# Patient Record
Sex: MALE | Race: WHITE | URBAN - METROPOLITAN AREA
[De-identification: names, ages, dates, MRNs, and addresses within clinical notes are randomized per-mention and may not be internally consistent; named-entity substitution may affect disease eponyms.]

---

## 2022-02-09 ENCOUNTER — PATIENT MESSAGE (OUTPATIENT)
Dept: INTERNAL MEDICINE | Facility: CLINIC | Age: 32
End: 2022-02-09
Payer: COMMERCIAL

## 2022-02-10 ENCOUNTER — LAB VISIT (OUTPATIENT)
Dept: LAB | Facility: HOSPITAL | Age: 32
End: 2022-02-10
Payer: COMMERCIAL

## 2022-02-10 ENCOUNTER — OFFICE VISIT (OUTPATIENT)
Dept: INTERNAL MEDICINE | Facility: CLINIC | Age: 32
End: 2022-02-10
Payer: COMMERCIAL

## 2022-02-10 ENCOUNTER — PATIENT MESSAGE (OUTPATIENT)
Dept: INTERNAL MEDICINE | Facility: CLINIC | Age: 32
End: 2022-02-10

## 2022-02-10 DIAGNOSIS — Z83.49 FAMILY HISTORY OF HEMOCHROMATOSIS: Primary | ICD-10-CM

## 2022-02-10 DIAGNOSIS — Z00.00 ANNUAL PHYSICAL EXAM: ICD-10-CM

## 2022-02-10 DIAGNOSIS — R10.11 RIGHT UPPER QUADRANT ABDOMINAL PAIN: Primary | ICD-10-CM

## 2022-02-10 DIAGNOSIS — Z83.49 FAMILY HISTORY OF HEMOCHROMATOSIS: ICD-10-CM

## 2022-02-10 DIAGNOSIS — R10.11 RIGHT UPPER QUADRANT ABDOMINAL PAIN: ICD-10-CM

## 2022-02-10 LAB
ALBUMIN SERPL BCP-MCNC: 4.6 G/DL (ref 3.5–5.2)
ALP SERPL-CCNC: 62 U/L (ref 55–135)
ALT SERPL W/O P-5'-P-CCNC: 24 U/L (ref 10–44)
ANION GAP SERPL CALC-SCNC: 9 MMOL/L (ref 8–16)
AST SERPL-CCNC: 24 U/L (ref 10–40)
BASOPHILS # BLD AUTO: 0.04 K/UL (ref 0–0.2)
BASOPHILS NFR BLD: 0.6 % (ref 0–1.9)
BILIRUB SERPL-MCNC: 0.8 MG/DL (ref 0.1–1)
BILIRUB UR QL STRIP: NEGATIVE
BUN SERPL-MCNC: 10 MG/DL (ref 6–20)
CALCIUM SERPL-MCNC: 10 MG/DL (ref 8.7–10.5)
CHLORIDE SERPL-SCNC: 102 MMOL/L (ref 95–110)
CHOLEST SERPL-MCNC: 253 MG/DL (ref 120–199)
CHOLEST/HDLC SERPL: 5.5 {RATIO} (ref 2–5)
CLARITY UR REFRACT.AUTO: CLEAR
CO2 SERPL-SCNC: 29 MMOL/L (ref 23–29)
COLOR UR AUTO: YELLOW
CREAT SERPL-MCNC: 0.8 MG/DL (ref 0.5–1.4)
DIFFERENTIAL METHOD: NORMAL
EOSINOPHIL # BLD AUTO: 0.3 K/UL (ref 0–0.5)
EOSINOPHIL NFR BLD: 4.6 % (ref 0–8)
ERYTHROCYTE [DISTWIDTH] IN BLOOD BY AUTOMATED COUNT: 12 % (ref 11.5–14.5)
EST. GFR  (AFRICAN AMERICAN): >60 ML/MIN/1.73 M^2
EST. GFR  (NON AFRICAN AMERICAN): >60 ML/MIN/1.73 M^2
FERRITIN SERPL-MCNC: 167 NG/ML (ref 20–300)
GLUCOSE SERPL-MCNC: 87 MG/DL (ref 70–110)
GLUCOSE UR QL STRIP: NEGATIVE
HCT VFR BLD AUTO: 46.7 % (ref 40–54)
HDLC SERPL-MCNC: 46 MG/DL (ref 40–75)
HDLC SERPL: 18.2 % (ref 20–50)
HGB BLD-MCNC: 15.7 G/DL (ref 14–18)
HGB UR QL STRIP: NEGATIVE
IMM GRANULOCYTES # BLD AUTO: 0.01 K/UL (ref 0–0.04)
IMM GRANULOCYTES NFR BLD AUTO: 0.2 % (ref 0–0.5)
KETONES UR QL STRIP: NEGATIVE
LDLC SERPL CALC-MCNC: 179.8 MG/DL (ref 63–159)
LEUKOCYTE ESTERASE UR QL STRIP: NEGATIVE
LYMPHOCYTES # BLD AUTO: 2.3 K/UL (ref 1–4.8)
LYMPHOCYTES NFR BLD: 34.9 % (ref 18–48)
MCH RBC QN AUTO: 30.7 PG (ref 27–31)
MCHC RBC AUTO-ENTMCNC: 33.6 G/DL (ref 32–36)
MCV RBC AUTO: 91 FL (ref 82–98)
MONOCYTES # BLD AUTO: 0.5 K/UL (ref 0.3–1)
MONOCYTES NFR BLD: 7.3 % (ref 4–15)
NEUTROPHILS # BLD AUTO: 3.4 K/UL (ref 1.8–7.7)
NEUTROPHILS NFR BLD: 52.4 % (ref 38–73)
NITRITE UR QL STRIP: NEGATIVE
NONHDLC SERPL-MCNC: 207 MG/DL
NRBC BLD-RTO: 0 /100 WBC
PH UR STRIP: 7 [PH] (ref 5–8)
PLATELET # BLD AUTO: 247 K/UL (ref 150–450)
PMV BLD AUTO: 10.3 FL (ref 9.2–12.9)
POTASSIUM SERPL-SCNC: 4.3 MMOL/L (ref 3.5–5.1)
PROT SERPL-MCNC: 7.6 G/DL (ref 6–8.4)
PROT UR QL STRIP: NEGATIVE
RBC # BLD AUTO: 5.12 M/UL (ref 4.6–6.2)
SODIUM SERPL-SCNC: 140 MMOL/L (ref 136–145)
SP GR UR STRIP: 1.01 (ref 1–1.03)
TRANSFERRIN SERPL-MCNC: 297 MG/DL (ref 200–375)
TRIGL SERPL-MCNC: 136 MG/DL (ref 30–150)
URN SPEC COLLECT METH UR: NORMAL
WBC # BLD AUTO: 6.47 K/UL (ref 3.9–12.7)

## 2022-02-10 PROCEDURE — 99999 PR PBB SHADOW E&M-EST. PATIENT-LVL II: ICD-10-PCS | Mod: PBBFAC,,,

## 2022-02-10 PROCEDURE — 84466 ASSAY OF TRANSFERRIN: CPT

## 2022-02-10 PROCEDURE — 82728 ASSAY OF FERRITIN: CPT

## 2022-02-10 PROCEDURE — 99999 PR PBB SHADOW E&M-EST. PATIENT-LVL II: CPT | Mod: PBBFAC,,,

## 2022-02-10 PROCEDURE — 99204 OFFICE O/P NEW MOD 45 MIN: CPT | Mod: S$GLB,,,

## 2022-02-10 PROCEDURE — 80061 LIPID PANEL: CPT

## 2022-02-10 PROCEDURE — 36415 COLL VENOUS BLD VENIPUNCTURE: CPT

## 2022-02-10 PROCEDURE — 80053 COMPREHEN METABOLIC PANEL: CPT

## 2022-02-10 PROCEDURE — 99204 PR OFFICE/OUTPT VISIT, NEW, LEVL IV, 45-59 MIN: ICD-10-PCS | Mod: S$GLB,,,

## 2022-02-10 PROCEDURE — 85025 COMPLETE CBC W/AUTO DIFF WBC: CPT

## 2022-02-10 PROCEDURE — 81003 URINALYSIS AUTO W/O SCOPE: CPT

## 2022-02-10 NOTE — PROGRESS NOTES
[facility]  RESIDENT CLINIC PROGRESS NOTE    Name: Patrick Franke  : 1990  Date of Service: 02/10/2022   PCP: Humberto Bettencourt MD      Subjective:             Reason for visit: No chief complaint on file.      HPI: Patrick Franke is a 31 y.o. male with Hx of Nephrolithiasis and Hx of untreated strep throat with resulting frequent sinus infections who presents to clinic for RUQ abdominal pain and an annual physical.    Abdominal Pain: The pain is described as stabbing, and is 8/10 in intensity. Pain is located in the RUQ without radiation. Onset was 2 weeks ago. Symptoms have been gradually worsening since. Aggravating factors: none noticed by pt.  Alleviating factors: belching, bowel movements, but he hasn't tried any meds.  Associated symptoms: belching, flatus. The patient denies anorexia, arthralgias, chills, constipation, diarrhea, dysuria, fever, frequency, headache, hematochezia, hematuria, melena, myalgias, nausea, sweats and vomiting.  He says there is no association of the timing of the pain with eating.    He describes a similar episode of similar symptoms 12 years ago: at the time, a doctor told him it was due to calcium oxalate kidney stones, although he can't remember which exact tests were used to come to that conclusion (but he thinks it was maybe by urinalysis).  The doctor advised him at the time to drink lots of H2O.  He did so, and the symptoms went away after a few weeks.  The doctor also told him to avoid a diet rich in oxalate such as broccoli, etc.  He has been drinking plenty of water since this current episode started but has not changed his diet.    Mr. Franke also seeks to establish care with [facility] today, as he is an  who recently moved to the area from [place], [place].    FHx: Paternal grandfather: Heart Dx, Dad: high cholesterol.  Mom's side: DM2, Sister: Type 1 DM after EtOH.  He describes a family history of hemochromatosis in his  paternal grandfather and father.  SHx: none.  Meds: only antihistamine when allergies flare.  - Cardiovascular: Diet is healthy, minimal fried foods.  Exercise: runs and bikes, abisai before abdo pain.  - Cancer: No FHx.  Former 1PPW smoker in college (last period of consistent smoking was 8 yrs ago), now just 1 cigarette on occasion.  EtOH: 4 drinks/wk.  Drugs: none.  - Immunizations: Needs TDAP, influenza.  Has been vaccinated and boosted v COVID.  - STDs/Bloodborne: Sexually active w fiance only, since 3 yrs.  No complaints of STD symptoms.      Medications:   Current Outpatient Medications:     diphth,pertus,acell,,tetanus (BOOSTRIX TDAP) 2.5-8-5 Lf-mcg-Lf/0.5mL Syrg injection, Inject 0.5 mLs into the muscle once. for 1 dose, Disp: 0.5 mL, Rfl: 0    flu vacc [id number] 6mos up,PF, 60 mcg (15 mcg x 4)/0.5 mL Syrg, Inject 0.5 mLs into the muscle once. for 1 dose, Disp: 0.5 mL, Rfl: 0     Review of Systems:   Review of Systems   Constitutional: Negative for chills, fever, malaise/fatigue and weight loss.   HENT: Negative for congestion, nosebleeds, sinus pain and sore throat.    Eyes: Negative for blurred vision, photophobia, pain and redness.   Respiratory: Negative for cough, hemoptysis, sputum production, shortness of breath, wheezing and stridor.    Cardiovascular: Negative for chest pain, palpitations, orthopnea, claudication and leg swelling.   Gastrointestinal: Positive for abdominal pain. Negative for blood in stool, constipation, diarrhea, heartburn, nausea and vomiting.   Genitourinary: Negative for dysuria, flank pain, frequency and urgency.   Musculoskeletal: Negative for back pain, joint pain, myalgias and neck pain.   Skin: Negative for itching and rash.   Neurological: Negative for dizziness, tremors, seizures and headaches.   Endo/Heme/Allergies: Negative for polydipsia.   Psychiatric/Behavioral: Negative for depression and substance abuse. The patient is not nervous/anxious and does not have  insomnia.           Objective:       Vitals:   BP: 128/74  HR: 75  RR: 14  SpO2: 98%  T: 97.5F    Height: 177 cm  Weight: 80.2 kg  BMI: 25.6    Physical Exam:   Physical Exam  Constitutional:       General: He is not in acute distress.     Appearance: Normal appearance. He is normal weight. He is not ill-appearing.   HENT:      Head: Normocephalic and atraumatic.      Right Ear: External ear normal.      Left Ear: External ear normal.      Nose: Nose normal.      Mouth/Throat:      Mouth: Mucous membranes are moist.      Pharynx: Oropharynx is clear. No oropharyngeal exudate or posterior oropharyngeal erythema.   Eyes:      Extraocular Movements: Extraocular movements intact.      Conjunctiva/sclera: Conjunctivae normal.      Pupils: Pupils are equal, round, and reactive to light.   Cardiovascular:      Rate and Rhythm: Normal rate and regular rhythm.      Pulses: Normal pulses.      Heart sounds: Normal heart sounds.   Pulmonary:      Effort: Pulmonary effort is normal.      Breath sounds: Normal breath sounds.   Abdominal:      General: Abdomen is flat. Bowel sounds are normal. There is no distension.      Palpations: Abdomen is soft. There is no mass.      Tenderness: There is no abdominal tenderness. There is no right CVA tenderness, left CVA tenderness, guarding or rebound.      Hernia: No hernia is present.   Musculoskeletal:         General: Normal range of motion.      Cervical back: Normal range of motion and neck supple.      Right lower leg: No edema.      Left lower leg: No edema.   Lymphadenopathy:      Cervical: No cervical adenopathy.   Skin:     General: Skin is warm and dry.      Capillary Refill: Capillary refill takes less than 2 seconds.      Coloration: Skin is not jaundiced.   Neurological:      General: No focal deficit present.      Mental Status: He is alert and oriented to person, place, and time.   Psychiatric:         Mood and Affect: Mood normal.         Behavior: Behavior normal.          Thought Content: Thought content normal.         Judgment: Judgment normal.         Labs: No previous labs available for review.    Imaging: No previous imaging available for review.        Assessment and Plan:       Right upper quadrant abdominal pain  I suspect this to be a recurrence of his previous nephrolithiasis, because of his previous history, lack of GI symptoms or findings, and not having changed his diet to avoid pro-lithiasis foods.  - Educated on drinking water and diet to avoid Ca2+ - oxalate stone formation  - Instructed to RTC in 2 wks if sxs don't resolve, or to go to ER if nausea, fever, or intense, unrelenting pain  - Urinalysis  - CMP    Annual physical exam  Other than his abdominal complaint above, he is healthy.  - CBC  - CMP  - A1c  - TDAP and flu vaccines    Family history of hemochromatosis  Physical exam today is completely benign. No hepatomegaly, Watson's sign negative, skin and nails clear.  Although abdominal pain can be a presenting sign, and we will check for hemochromatosis in him per the guidelines, his benign exam makes me suspect his abdo pain is more likely a recurrence of his previous nephrolithiasis as per above.  - Ferritin, Transferrin saturation today  - If either is elevated, will order an HFE mutation analysis    Preventative Health: as above.    RTC in 1 year, or 2 weeks if abdominal symptoms persist.    Discussed with Dr. Tone Bettencourt MD MPH  [facility], PGY-I  [facility]

## 2022-02-10 NOTE — PATIENT INSTRUCTIONS
We suspect you are having a recurrence of calcium-oxalate kidney stones developing.  Keep drinking plenty of water (a few sips more than what your body tells you is enough), and avoid making foods rich in oxalate the foundation of your diet.    If you get nauseated, a fever, or start vomiting, go to the ER.  If you're still having symptoms in a few weeks, come back to see me and we'll set up some further investigations.    Otherwise, keep up the great work on your health and welcome to [facility]Patient Education       Kidney Stone Diet   About this topic   A kidney stone is a hard mass that is made up of tiny crystals in the kidneys. They get stuck and cause pain as they try to leave the kidney. There are different kinds of kidney stones. The most common ones are made of calcium and oxalate. What you eat and drink can cause kidney stones to form or grow.  What will the results be?   When you make changes to your diet, you may help prevent kidney stones.  What drugs may be needed?   Talk to your doctor about what drugs you take. You may need to start or stop taking a drug, vitamin, or supplement based on your needs.  What changes to diet are needed?   The kind of kidney stone you have will guide what foods you should eat or avoid. Talk to your doctor or dietitian about your diet based on what kind of kidney stone you had. They may suggest you:  · Drink more fluids. Then you will make more urine. Drink at least 12 cups of fluids (3 liters) each day. Drink more if it is hot or you exercise.  · Eat less salt. You get most of the salt or sodium in your diet from processed or prepared foods. Choose no salt added or low-salt foods. Foods you may want to limit or avoid are: Cured meats like hot dogs, bratwurst, or kirby; pickles, olives, canned pasta sauce or soup, some salad dressings, some frozen meals, and table salt.  · Eat less animal protein. This includes meat, poultry, seafood, and eggs. Talk to your doctor or  dietitian about how much meat or protein you need each day.  · Get the right amount of calcium in your diet. Too much or too little calcium can cause kidney stones to form. Talk to your doctor or dietitian about how much calcium you need each day. Calcium is in dairy products such as milk, cheese, and yogurt. You can also get calcium from cooked broccoli, beans, dried figs, and tofu.  · Eat less foods that are high in oxalate. Foods high in oxalate include spinach, rhubarb, beets, bran flakes, potatoes, and nuts or nut butters.  · Eat less foods that are high in purine. Foods high in purines include organ meat, kirby, anchovies, sardines, and herring. You should also limit alcohol.  · Eat less foods and drinks sweetened with high fructose corn syrup.  · Get at least 5 servings of fruits and vegetables a day.  · Get the right amount of magnesium. Ask your doctor or dietitian how much magnesium you need each day. Foods high in magnesium include almonds, cashews, peanuts, peanut butter, black beans, kidney beans, oatmeal, brown rice, whole wheat bread, cooked spinach, baked potato with the skin, and edamame. You may need a magnesium supplement if you dont get enough in your diet.  · Avoid high doses of Vitamin C. High intake of Vitamin C may produce more oxalate.  Helpful tips   · Replace some of the animal protein in your diet with plant-based foods. This includes beans; dried peas; lentils; nuts; seeds; and soy products like tofu. These foods are high in protein.  · Learn to read the food label. The food label tells you what is in the food you eat.  Where can I learn more?   [employer]  https://www.kidney.org/atoz/content/kidneystones_prevent   Last Reviewed Date   2021-10-08  Consumer Information Use and Disclaimer   This information is not specific medical advice and does not replace information you receive from your health care provider. This is only a brief summary of general information. It does  NOT include all information about conditions, illnesses, injuries, tests, procedures, treatments, therapies, discharge instructions or life-style choices that may apply to you. You must talk with your health care provider for complete information about your health and treatment options. This information should not be used to decide whether or not to accept your health care providers advice, instructions or recommendations. Only your health care provider has the knowledge and training to provide advice that is right for you.  Copyright   Copyright © 2021 [employer] and its affiliates and/or licensors. All rights reserved.

## 2022-02-11 VITALS
BODY MASS INDEX: 25.31 KG/M2 | HEART RATE: 75 BPM | WEIGHT: 176.81 LBS | HEIGHT: 70 IN | SYSTOLIC BLOOD PRESSURE: 128 MMHG | DIASTOLIC BLOOD PRESSURE: 74 MMHG

## 2022-02-11 NOTE — PROGRESS NOTES
I have reviewed the notes, assessments, and/or procedures performed this visit, and I concur with the documentation for Mr. Patrick Franke.    RUQ pain likely related to nephrolithiasis given history of this and patient reports similar type of pain. Follow up labs and consider imaging if pain persists. Given instructions on when to go to ED. Annual labs to establish care.    Barbara Wayne MD  Hospital Medicine Assistant Staff  [facility]

## 2022-02-14 ENCOUNTER — PATIENT MESSAGE (OUTPATIENT)
Dept: INTERNAL MEDICINE | Facility: CLINIC | Age: 32
End: 2022-02-14
Payer: COMMERCIAL

## 2022-03-02 ENCOUNTER — PATIENT MESSAGE (OUTPATIENT)
Dept: INTERNAL MEDICINE | Facility: CLINIC | Age: 32
End: 2022-03-02
Payer: COMMERCIAL